# Patient Record
Sex: MALE | Race: WHITE
[De-identification: names, ages, dates, MRNs, and addresses within clinical notes are randomized per-mention and may not be internally consistent; named-entity substitution may affect disease eponyms.]

---

## 2017-08-03 ENCOUNTER — HOSPITAL ENCOUNTER (OUTPATIENT)
Dept: HOSPITAL 62 - END | Age: 57
Discharge: HOME | End: 2017-08-03
Attending: SURGERY
Payer: COMMERCIAL

## 2017-08-03 VITALS — DIASTOLIC BLOOD PRESSURE: 81 MMHG | SYSTOLIC BLOOD PRESSURE: 125 MMHG

## 2017-08-03 DIAGNOSIS — Z90.49: ICD-10-CM

## 2017-08-03 DIAGNOSIS — Z85.828: ICD-10-CM

## 2017-08-03 DIAGNOSIS — D12.4: Primary | ICD-10-CM

## 2017-08-03 DIAGNOSIS — Z79.899: ICD-10-CM

## 2017-08-03 DIAGNOSIS — N40.0: ICD-10-CM

## 2017-08-03 PROCEDURE — 0DBG8ZX EXCISION OF LEFT LARGE INTESTINE, VIA NATURAL OR ARTIFICIAL OPENING ENDOSCOPIC, DIAGNOSTIC: ICD-10-PCS | Performed by: SURGERY

## 2017-08-03 PROCEDURE — 45385 COLONOSCOPY W/LESION REMOVAL: CPT

## 2017-08-03 RX ADMIN — MIDAZOLAM HYDROCHLORIDE ONE MG: 1 INJECTION, SOLUTION INTRAMUSCULAR; INTRAVENOUS at 08:44

## 2017-08-03 RX ADMIN — MIDAZOLAM HYDROCHLORIDE ONE MG: 1 INJECTION, SOLUTION INTRAMUSCULAR; INTRAVENOUS at 08:39

## 2017-08-03 NOTE — OPERATIVE REPORT
Operative Report


DATE OF SURGERY: 08/03/17


PREOPERATIVE DIAGNOSIS: Hx of Tubular adenoma s/p Right colectomy


POSTOPERATIVE DIAGNOSIS: same; polyp left colon.  Prostatic enlargement


OPERATION: 1.  Total colonoscopy to cecum.  2.  Hot snare polypectomy left 

colon polyp


SURGEON: ANGEL HIDALGO


ANESTHESIA: Moderate Sedation


TISSUE REMOVED OR ALTERED: 1: Polyp


COMPLICATIONS: 





None


ESTIMATED BLOOD LOSS: Scant


INTRAOPERATIVE FINDINGS: See below


PROCEDURE: 


Obtaining informed consent the patient was taken from the preoperative holding 

area to the main endoscopy suite where monitoring devices were attached to  the 

patient.  Plan and surgical timeout were conducted





The patient was placed in the left lateral decubitus  position with knees to 

chest.  A perianal examination was performed.  There was no visible or palpable 

anorectal pathology.  The posterior surface of the prostate gland was enlarged 

sphincter tone was felt to be normal.





The flexible adult colonoscope was advanced through the anal rectal canal, all 

the way to the ileotransverse colon anastomosis.  The scope was advanced into 

the terminal ileum which was grossly normal.  There was no evidence of tumor 

recurrence.   This was an excellent study on the well-prepped bowel.  The 

colonoscope was withdrawn slowly and methodically checked and the mucosa 

carefully.  There was no evidence of tumor, stricture, bleeding; there were 1 

or 2 diverticuli, and a small 3 mm pedunculated polyp of the left colon 

possibly close to the splenic flexure which was removed with the hot snare 

device.  The time of this dictation the specimen was attempted to be retrieved 

from within the scope.  The scope was slowly withdrawn through the anal rectal 

canal.  Complete visualization of the rectum was achieved with 

photodocumentation.





The scope was withdrawn to the patient's anus.  The patient tolerated the 

procedure well and was taken to the recovery area in stable condition.





Per surveillance guidelines, patient will be appropriate candidate for follow-

up colonoscopy in 3 years, or sooner if symptoms develop.

## 2017-08-03 NOTE — PDOC DISCHARGE SUMMARY
Discharge Summary (SDC)





- Discharge


Final Diagnosis: 


1.  Status post right hemicolectomy





2.  Left colon polyp





3 prostatic enlargement


Date of Surgery: 08/03/17


Discharge Date: 08/03/17


Condition: Good


Treatment or Instructions: 


               





                  [address] [address]


 Phone: [phone]    Fax:  [phone]








            


            POST ENDOSCOPY DISCHARGE INSTRUCTIONS








1.  Diet:  Start clear liquids that a regular diet as tolerated.





2.  Resume all preoperative medications.  All oral anticoagulants and aspirins 

can be resumed 24 hours after procedure.





3.  If a polypectomy was performed some bleeding per rectum may occur.  This 

should stop within 3 days.  If not, please contact the office.





4.  If you had a colonoscopy you may experience some bloating and delayed 

return of normal bowel function for several days, your regular bowel movement 

pattern should resume within a week.





5.  Please contact [facility] at [phone] to make an 

appointment with Dr. Morillo for 1 to 3 weeks following procedure.





6.  If you have any questions or concerns regarding your care,treatment plan or 

follow up, please contact our office.





7.  Per clinical guidelines we recommend you undergo a repeat colonoscopy in 3 

years 


Discharge Diet: As Tolerated


Discharge Activity: Activity As Tolerated


Home Care Assistance: None Needed


Report the Following to Your Physician Immediately: Shortness of Breath, 

Increase in Pain, Fever over 101 Degrees

## 2020-11-24 ENCOUNTER — HOSPITAL ENCOUNTER (OUTPATIENT)
Dept: HOSPITAL 62 - OD | Age: 60
End: 2020-11-24
Attending: NURSE PRACTITIONER
Payer: COMMERCIAL

## 2020-11-24 DIAGNOSIS — Z01.818: Primary | ICD-10-CM

## 2020-11-24 PROCEDURE — 93010 ELECTROCARDIOGRAM REPORT: CPT

## 2020-11-24 PROCEDURE — 93005 ELECTROCARDIOGRAM TRACING: CPT

## 2020-11-24 PROCEDURE — 71046 X-RAY EXAM CHEST 2 VIEWS: CPT

## 2020-11-24 NOTE — RADIOLOGY REPORT (SQ)
EXAM DESCRIPTION:  CHEST 2 VIEWS



IMAGES COMPLETED DATE/TIME:  11/24/2020 12:52 pm



REASON FOR STUDY:  Z01.818 ENCOUNTER FOR OTHER PREPROCEDURAL EXAMINATION



COMPARISON:  5/8/2015



EXAM PARAMETERS:  NUMBER OF VIEWS: two views

TECHNIQUE: Digital Frontal and Lateral radiographic views of the chest acquired.

RADIATION DOSE: NA

LIMITATIONS: none



FINDINGS:  LUNGS AND PLEURA: No opacities, masses or pneumothorax. No pleural effusion.

MEDIASTINUM AND HILAR STRUCTURES: No masses or contour abnormalities.

HEART AND VASCULAR STRUCTURES: Heart normal size.  No evidence for failure.

BONES: No acute findings.

HARDWARE: None in the chest.

OTHER: No other significant finding.



IMPRESSION:  NO ACUTE RADIOGRAPHIC FINDING IN THE CHEST.



TECHNICAL DOCUMENTATION:  JOB ID:  [id number]

 2011 [employer]- All Rights Reserved



Reading location - IP/workstation name: [email or web address]

## 2020-11-24 NOTE — EKG REPORT
SEVERITY:- OTHERWISE NORMAL ECG -

SINUS RHYTHM

BORDERLINE LEFT AXIS DEVIATION

:

Confirmed by: Ricky Steinberg MD 24-Nov-2020 12:47:11